# Patient Record
Sex: MALE | Race: WHITE | Employment: FULL TIME | URBAN - METROPOLITAN AREA
[De-identification: names, ages, dates, MRNs, and addresses within clinical notes are randomized per-mention and may not be internally consistent; named-entity substitution may affect disease eponyms.]

---

## 2017-09-14 ENCOUNTER — OFFICE VISIT (OUTPATIENT)
Dept: SURGERY | Age: 52
End: 2017-09-14

## 2017-09-14 VITALS
HEIGHT: 72 IN | SYSTOLIC BLOOD PRESSURE: 124 MMHG | DIASTOLIC BLOOD PRESSURE: 88 MMHG | BODY MASS INDEX: 28.17 KG/M2 | WEIGHT: 208 LBS

## 2017-09-14 DIAGNOSIS — K40.90 RIGHT INGUINAL HERNIA: Primary | ICD-10-CM

## 2017-09-14 PROCEDURE — 99243 OFF/OP CNSLTJ NEW/EST LOW 30: CPT | Performed by: SURGERY

## 2017-09-14 RX ORDER — ATORVASTATIN CALCIUM 20 MG/1
20 TABLET, FILM COATED ORAL
COMMUNITY
Start: 2017-03-17

## 2017-09-14 ASSESSMENT — ENCOUNTER SYMPTOMS
RESPIRATORY NEGATIVE: 1
GASTROINTESTINAL NEGATIVE: 1
ALLERGIC/IMMUNOLOGIC NEGATIVE: 1
EYES NEGATIVE: 1

## 2017-12-04 ENCOUNTER — HOSPITAL ENCOUNTER (OUTPATIENT)
Dept: SURGERY | Age: 52
Discharge: HOME | End: 2017-12-04
Attending: SURGERY | Admitting: SURGERY

## 2017-12-04 VITALS
SYSTOLIC BLOOD PRESSURE: 139 MMHG | WEIGHT: 209.3 LBS | HEART RATE: 93 BPM | BODY MASS INDEX: 28.35 KG/M2 | HEIGHT: 72 IN | DIASTOLIC BLOOD PRESSURE: 91 MMHG | RESPIRATION RATE: 16 BRPM | TEMPERATURE: 97.9 F | OXYGEN SATURATION: 100 %

## 2017-12-04 PROCEDURE — 49650 LAP ING HERNIA REPAIR INIT: CPT | Performed by: SURGERY

## 2017-12-04 RX ORDER — CEFAZOLIN SODIUM 2 G/100ML
2 INJECTION, SOLUTION INTRAVENOUS ONCE
Status: COMPLETED | OUTPATIENT
Start: 2017-12-04 | End: 2017-12-04

## 2017-12-04 RX ORDER — MORPHINE SULFATE 2 MG/ML
1 INJECTION, SOLUTION INTRAMUSCULAR; INTRAVENOUS EVERY 5 MIN PRN
Status: DISCONTINUED | OUTPATIENT
Start: 2017-12-04 | End: 2017-12-05 | Stop reason: HOSPADM

## 2017-12-04 RX ORDER — SODIUM CHLORIDE 0.9 % (FLUSH) 0.9 %
10 SYRINGE (ML) INJECTION EVERY 12 HOURS SCHEDULED
Status: DISCONTINUED | OUTPATIENT
Start: 2017-12-04 | End: 2017-12-05 | Stop reason: HOSPADM

## 2017-12-04 RX ORDER — OXYCODONE HYDROCHLORIDE AND ACETAMINOPHEN 5; 325 MG/1; MG/1
1-2 TABLET ORAL EVERY 4 HOURS PRN
Qty: 30 TABLET | Refills: 0 | Status: SHIPPED | OUTPATIENT
Start: 2017-12-04 | End: 2017-12-11

## 2017-12-04 RX ORDER — OXYCODONE HYDROCHLORIDE AND ACETAMINOPHEN 5; 325 MG/1; MG/1
TABLET ORAL
Status: DISPENSED
Start: 2017-12-04 | End: 2017-12-04

## 2017-12-04 RX ORDER — HYDROMORPHONE HCL 110MG/55ML
0.25 PATIENT CONTROLLED ANALGESIA SYRINGE INTRAVENOUS EVERY 5 MIN PRN
Status: DISCONTINUED | OUTPATIENT
Start: 2017-12-04 | End: 2017-12-05 | Stop reason: HOSPADM

## 2017-12-04 RX ORDER — HYDROCODONE BITARTRATE AND ACETAMINOPHEN 5; 325 MG/1; MG/1
1 TABLET ORAL PRN
Status: ACTIVE | OUTPATIENT
Start: 2017-12-04 | End: 2017-12-04

## 2017-12-04 RX ORDER — ONDANSETRON 2 MG/ML
4 INJECTION INTRAMUSCULAR; INTRAVENOUS
Status: ACTIVE | OUTPATIENT
Start: 2017-12-04 | End: 2017-12-04

## 2017-12-04 RX ORDER — LABETALOL HYDROCHLORIDE 5 MG/ML
5 INJECTION, SOLUTION INTRAVENOUS EVERY 10 MIN PRN
Status: DISCONTINUED | OUTPATIENT
Start: 2017-12-04 | End: 2017-12-05 | Stop reason: HOSPADM

## 2017-12-04 RX ORDER — HYDROMORPHONE HCL 110MG/55ML
0.5 PATIENT CONTROLLED ANALGESIA SYRINGE INTRAVENOUS EVERY 5 MIN PRN
Status: DISCONTINUED | OUTPATIENT
Start: 2017-12-04 | End: 2017-12-05 | Stop reason: HOSPADM

## 2017-12-04 RX ORDER — MIDAZOLAM HYDROCHLORIDE 1 MG/ML
2 INJECTION INTRAMUSCULAR; INTRAVENOUS
Status: ACTIVE | OUTPATIENT
Start: 2017-12-04 | End: 2017-12-04

## 2017-12-04 RX ORDER — MORPHINE SULFATE 10 MG/ML
2 INJECTION, SOLUTION INTRAMUSCULAR; INTRAVENOUS EVERY 5 MIN PRN
Status: DISCONTINUED | OUTPATIENT
Start: 2017-12-04 | End: 2017-12-05 | Stop reason: HOSPADM

## 2017-12-04 RX ORDER — SODIUM CHLORIDE 0.9 % (FLUSH) 0.9 %
10 SYRINGE (ML) INJECTION PRN
Status: DISCONTINUED | OUTPATIENT
Start: 2017-12-04 | End: 2017-12-05 | Stop reason: HOSPADM

## 2017-12-04 RX ORDER — SODIUM CHLORIDE, SODIUM LACTATE, POTASSIUM CHLORIDE, CALCIUM CHLORIDE 600; 310; 30; 20 MG/100ML; MG/100ML; MG/100ML; MG/100ML
INJECTION, SOLUTION INTRAVENOUS CONTINUOUS
Status: DISCONTINUED | OUTPATIENT
Start: 2017-12-04 | End: 2017-12-05 | Stop reason: HOSPADM

## 2017-12-04 RX ORDER — HYDROCODONE BITARTRATE AND ACETAMINOPHEN 5; 325 MG/1; MG/1
2 TABLET ORAL PRN
Status: ACTIVE | OUTPATIENT
Start: 2017-12-04 | End: 2017-12-04

## 2017-12-04 RX ORDER — OXYCODONE HYDROCHLORIDE AND ACETAMINOPHEN 5; 325 MG/1; MG/1
1 TABLET ORAL ONCE
Status: COMPLETED | OUTPATIENT
Start: 2017-12-04 | End: 2017-12-04

## 2017-12-04 RX ADMIN — OXYCODONE HYDROCHLORIDE AND ACETAMINOPHEN 1 TABLET: 5; 325 TABLET ORAL at 11:50

## 2017-12-04 RX ADMIN — SODIUM CHLORIDE, SODIUM LACTATE, POTASSIUM CHLORIDE, CALCIUM CHLORIDE: 600; 310; 30; 20 INJECTION, SOLUTION INTRAVENOUS at 08:08

## 2017-12-04 RX ADMIN — CEFAZOLIN SODIUM 2 G: 2 INJECTION, SOLUTION INTRAVENOUS at 09:06

## 2017-12-04 ASSESSMENT — PAIN DESCRIPTION - FREQUENCY: FREQUENCY: CONTINUOUS

## 2017-12-04 ASSESSMENT — PAIN DESCRIPTION - ORIENTATION: ORIENTATION: LOWER;MID

## 2017-12-04 ASSESSMENT — PAIN SCALES - GENERAL
PAINLEVEL_OUTOF10: 2
PAINLEVEL_OUTOF10: 4
PAINLEVEL_OUTOF10: 4
PAINLEVEL_OUTOF10: 3
PAINLEVEL_OUTOF10: 4

## 2017-12-04 ASSESSMENT — PAIN DESCRIPTION - PAIN TYPE: TYPE: SURGICAL PAIN

## 2017-12-04 ASSESSMENT — PAIN DESCRIPTION - DESCRIPTORS: DESCRIPTORS: ACHING;DULL

## 2017-12-04 ASSESSMENT — PAIN DESCRIPTION - LOCATION: LOCATION: ABDOMEN

## 2017-12-04 ASSESSMENT — PAIN - FUNCTIONAL ASSESSMENT: PAIN_FUNCTIONAL_ASSESSMENT: 0-10

## 2017-12-04 NOTE — ANESTHESIA PRE-OP
History   Substance Use Topics    Smoking status: Never Smoker    Smokeless tobacco: Never Used    Alcohol use Yes      Comment: RARE                                Counseling given: Not Answered      Vital Signs (Current):   Vitals:    12/04/17 0735   Weight: 209 lb 4.8 oz (94.9 kg)   Height: 6' (1.829 m)                                              BP Readings from Last 3 Encounters:   09/14/17 124/88       NPO Status: Time of last liquid consumption: 2200                        Time of last solid consumption: 1900                        Date of last liquid consumption: 12/03/17                        Date of last solid food consumption: 12/03/17    BMI:   Wt Readings from Last 3 Encounters:   12/04/17 209 lb 4.8 oz (94.9 kg)   11/29/17 210 lb (95.3 kg)   09/14/17 208 lb (94.3 kg)     Body mass index is 28.39 kg/m². Anesthesia Evaluation  Patient summary reviewed  Airway: Mallampati: II  TM distance: >3 FB   Neck ROM: full  Mouth opening: > = 3 FB Dental: normal exam         Pulmonary:Negative Pulmonary ROS and normal exam  breath sounds clear to auscultation                             Cardiovascular:Negative CV ROS  Exercise tolerance: good (>4 METS),   (+) hyperlipidemia        Rhythm: regular  Rate: normal           Beta Blocker:  Not on Beta Blocker         Neuro/Psych:   Negative Neuro/Psych ROS              GI/Hepatic/Renal: Neg GI/Hepatic/Renal ROS            Endo/Other: Negative Endo/Other ROS                    Abdominal:           Vascular: negative vascular ROS. Anesthesia Plan      general     ASA 2       Induction: intravenous. Anesthetic plan and risks discussed with patient. Plan discussed with CRNA.                   Jose Neves MD   12/4/2017

## 2017-12-04 NOTE — ANESTHESIA POST-OP
Anesthesia Post-op Note    Patient: Alivia Corey  MRN: [id number]  YOB: 1965  Date of evaluation: 12/4/2017  Time:  12:40 PM     Procedure(s) Performed:     Last Vitals: BP (!) 139/91   Pulse 93   Temp 97.9 °F (36.6 °C) (Temporal)   Resp 16   Ht 6' (1.829 m)   Wt 209 lb 4.8 oz (94.9 kg)   SpO2 100%   BMI 28.39 kg/m²     Ana Maria Phase I: Ana Maria Score: 10    Ana Maria Phase II: Ana Maria Score: 10    Anesthesia Post Evaluation    Final anesthesia type: general  Patient location during evaluation: OR  Patient participation: complete - patient participated  Level of consciousness: awake  Airway patency: patent  Nausea & Vomiting: no vomiting and no nausea  Complications: no  Cardiovascular status: blood pressure returned to baseline and hemodynamically stable  Respiratory status: acceptable  Hydration status: stable        Mercedez Day MD  12:40 PM

## 2017-12-04 NOTE — OP NOTE
[facility] [address]                      [address] [facility], [address] [facility]                                 OPERATIVE REPORT    PATIENT NAME: Kimmie Gonzalez                 :        1965  MED REC NO:   [id number]                          ROOM:  ACCOUNT NO:   [de-identified]                          ADMIT DATE: 2017  PROVIDER:     Handy Gallagher MD    DATE OF PROCEDURE:  2017    PREOPERATIVE DIAGNOSIS:  Right inguinal hernia. POSTOPERATIVE DIAGNOSIS:  Right inguinal hernia. PROCEDURE:  Laparoscopic right inguinal hernia repair with mesh. SURGEON:  Handy Glalagher MD    ANESTHESIA:  General endotracheal    ESTIMATED BLOOD LOSS:  Minimal.    COMPLICATIONS:  None. SPECIMEN:  None. OPERATIVE INDICATIONS:  The patient is a pleasant 80-year-old male with a  symptomatic right inguinal hernia. He is brought in today for repair. He  was explained the risks, benefits, and possible complications including  risk of hernia recurrence, infection requiring mesh removal or nerve  entrapment. DETAILS OF THE PROCEDURE:  The patient was brought to the operative suite,  placed in the supine position on the operative field. After general  endotracheal anesthesia, he was prepped and draped in usual sterile  fashion. We made a 12-mm transverse incision below the umbilicus. Dissection was carried down to the level of the external abdominal oblique  overlying the right rectus sheath. The external abdominal oblique was  opened vertically. The rectus muscles was then reflected laterally and we  created a space posterior to the rectus muscle. A dissecting balloon was  directed down toward the pubic symphysis and insufflated under direct  visualization. The dissecting balloon was then removed and a 12-mm trocar  was placed at this site. Two 5-mm trocars placed in the midline just below  the 12-mm trocar site.   The lateral space was cleared with blunt

## 2017-12-04 NOTE — PROGRESS NOTES
Patient education given  and the patient expresses understanding and acceptance of instructions. Carlos Yang 12/4/2017 12:08 PM  Discharge instructions reviewed with patient/responsible adult. All home medications have been reviewed, questions answered and patient verbalized understanding. Discharge instructions signed and copies given.

## 2017-12-04 NOTE — PROGRESS NOTES
Adm to Phase I PACU from OR awakening and responding. Abdomen soft. Denies pain VS and respirations adequate.  Will continue to monitor

## 2017-12-04 NOTE — H&P
Nahum Lewis is an 46 y.o.  male. Past Medical History:   Diagnosis Date    Hyperlipidemia        Allergies: No Known Allergies    Active Problems:    * No active hospital problems. *    There were no vitals taken for this visit. ROS    Physical Exam   Cardiovascular: Normal rate and regular rhythm.     Pulmonary/Chest: Effort normal and breath sounds normal.       Assessment:  R inguinal hernia    Plan:  Lap R inguinal hernia repair with mesh    Pk Kendall MD  12/4/2017

## 2017-12-21 ENCOUNTER — OFFICE VISIT (OUTPATIENT)
Dept: SURGERY | Age: 52
End: 2017-12-21

## 2017-12-21 VITALS — BODY MASS INDEX: 29.16 KG/M2 | DIASTOLIC BLOOD PRESSURE: 84 MMHG | SYSTOLIC BLOOD PRESSURE: 110 MMHG | WEIGHT: 215 LBS

## 2017-12-21 DIAGNOSIS — K40.90 RIGHT INGUINAL HERNIA: Primary | ICD-10-CM

## 2017-12-21 PROCEDURE — 99024 POSTOP FOLLOW-UP VISIT: CPT | Performed by: SURGERY

## 2017-12-21 NOTE — PROGRESS NOTES
Subjective:      Patient ID: Cameron Renteria is a 46 y.o. male. HPI    Review of Systems    Objective:   Physical Exam  Incision healing well  Assessment:      26-year-old male status post laparoscopic right inguinal hernia repair with mesh. Doing well postoperatively. Plan:      Continue lifting restrictions for 2 more weeks. Follow up on an as-needed basis.

## 2017-12-21 NOTE — LETTER
[address]  [address]  [address]  [address]  Phone: [phone]  Fax: [phone]    Misbah Blas MD        December 21, 2017     DO [place]ar[place] [address]    Patient: Cameron Renteria  MR Number: [id number]  YOB: 1965  Date of Visit: 12/21/2017    Dear Dr. Shant Perez:    Thank you for the request for consultation for Cameron Renteria. Below are the relevant portions of my assessment and plan of care. Assessment:     41-year-old male status post laparoscopic right inguinal hernia repair with mesh. Doing well postoperatively. Plan:     Continue lifting restrictions for 2 more weeks. Follow up on an as-needed basis. If you have questions, please do not hesitate to call me.      Sincerely,        Misbah Blas MD

## 2022-10-11 ENCOUNTER — NURSE ONLY (OUTPATIENT)
Dept: CARDIOLOGY CLINIC | Age: 57
End: 2022-10-11

## 2022-10-11 ENCOUNTER — HOSPITAL ENCOUNTER (OUTPATIENT)
Dept: VASCULAR LAB | Age: 57
Discharge: HOME | End: 2022-10-11

## 2022-10-11 DIAGNOSIS — Z00.00 ANNUAL PHYSICAL EXAM: Primary | ICD-10-CM

## 2022-10-11 LAB
A/G RATIO: 2.7 (ref 1.1–2.2)
ALBUMIN SERPL-MCNC: 4.8 G/DL (ref 3.4–5)
ALP BLD-CCNC: 84 U/L (ref 40–129)
ALT SERPL-CCNC: 32 U/L (ref 10–40)
ANION GAP SERPL CALCULATED.3IONS-SCNC: 10 MMOL/L (ref 3–16)
AST SERPL-CCNC: 23 U/L (ref 15–37)
BASOPHILS ABSOLUTE: 0 K/UL (ref 0–0.2)
BASOPHILS RELATIVE PERCENT: 0.8 %
BILIRUB SERPL-MCNC: 0.8 MG/DL (ref 0–1)
BILIRUBIN DIRECT: <0.2 MG/DL (ref 0–0.3)
BILIRUBIN, INDIRECT: NORMAL MG/DL (ref 0–1)
BUN BLDV-MCNC: 14 MG/DL (ref 7–20)
CALCIUM SERPL-MCNC: 9.6 MG/DL (ref 8.3–10.6)
CHLORIDE BLD-SCNC: 102 MMOL/L (ref 99–110)
CHOLESTEROL, TOTAL: 184 MG/DL (ref 0–199)
CO2: 28 MMOL/L (ref 21–32)
CREAT SERPL-MCNC: 1.1 MG/DL (ref 0.9–1.3)
EOSINOPHILS ABSOLUTE: 0.1 K/UL (ref 0–0.6)
EOSINOPHILS RELATIVE PERCENT: 2.8 %
GFR AFRICAN AMERICAN: >60
GFR NON-AFRICAN AMERICAN: >60
GLUCOSE BLD-MCNC: 96 MG/DL (ref 70–99)
HCT VFR BLD CALC: 44.9 % (ref 40.5–52.5)
HDLC SERPL-MCNC: 55 MG/DL (ref 40–60)
HEMOGLOBIN: 15.5 G/DL (ref 13.5–17.5)
HEPATITIS C ANTIBODY INTERPRETATION: NORMAL
LDL CHOLESTEROL CALCULATED: 103 MG/DL
LYMPHOCYTES ABSOLUTE: 0.9 K/UL (ref 1–5.1)
LYMPHOCYTES RELATIVE PERCENT: 23.6 %
MCH RBC QN AUTO: 31.1 PG (ref 26–34)
MCHC RBC AUTO-ENTMCNC: 34.7 G/DL (ref 31–36)
MCV RBC AUTO: 89.6 FL (ref 80–100)
MONOCYTES ABSOLUTE: 0.3 K/UL (ref 0–1.3)
MONOCYTES RELATIVE PERCENT: 8.4 %
NEUTROPHILS ABSOLUTE: 2.4 K/UL (ref 1.7–7.7)
NEUTROPHILS RELATIVE PERCENT: 64.4 %
PDW BLD-RTO: 12.7 % (ref 12.4–15.4)
PLATELET # BLD: 200 K/UL (ref 135–450)
PMV BLD AUTO: 7.5 FL (ref 5–10.5)
POTASSIUM SERPL-SCNC: 5.2 MMOL/L (ref 3.5–5.1)
PROSTATE SPECIFIC ANTIGEN: 3.5 NG/ML (ref 0–4)
RBC # BLD: 5 M/UL (ref 4.2–5.9)
SODIUM BLD-SCNC: 140 MMOL/L (ref 136–145)
T3 TOTAL: 1.23 NG/ML (ref 0.8–2)
T4 TOTAL: 7.6 UG/DL (ref 4.5–10.9)
TOTAL PROTEIN: 6.6 G/DL (ref 6.4–8.2)
TRIGL SERPL-MCNC: 130 MG/DL (ref 0–150)
VLDLC SERPL CALC-MCNC: 26 MG/DL
WBC # BLD: 3.8 K/UL (ref 4–11)

## 2022-10-11 PROCEDURE — 9900000021 HC VASC SCREENING EXAM - SELF PAY

## 2022-10-19 ENCOUNTER — TELEPHONE (OUTPATIENT)
Dept: CARDIOLOGY CLINIC | Age: 57
End: 2022-10-19

## 2022-10-19 NOTE — TELEPHONE ENCOUNTER
Called patient and [facility] for patient to return call to office for message below.

## 2022-10-24 ENCOUNTER — HOSPITAL ENCOUNTER (OUTPATIENT)
Dept: NON INVASIVE DIAGNOSTICS | Age: 57
Discharge: HOME | End: 2022-10-24

## 2022-10-24 PROCEDURE — 93017 CV STRESS TEST TRACING ONLY: CPT | Performed by: NURSE PRACTITIONER

## 2022-10-24 NOTE — PROGRESS NOTES
Patient instructed on Kamlesh Protocol Stress Test Procedure including possible side effects and adverse reactions. Verbalizes knowledge and understanding and denies having any questions.

## 2022-11-01 ENCOUNTER — OFFICE VISIT (OUTPATIENT)
Dept: CARDIOLOGY CLINIC | Age: 57
End: 2022-11-01

## 2022-11-01 VITALS
DIASTOLIC BLOOD PRESSURE: 70 MMHG | HEART RATE: 90 BPM | SYSTOLIC BLOOD PRESSURE: 120 MMHG | BODY MASS INDEX: 28.35 KG/M2 | HEIGHT: 72 IN | WEIGHT: 209.3 LBS | OXYGEN SATURATION: 97 %

## 2022-11-01 DIAGNOSIS — Z00.00 ANNUAL PHYSICAL EXAM: Primary | ICD-10-CM

## 2022-11-01 NOTE — PROGRESS NOTES
[facility] Annual Physical Exam        11/1/22    Chiara Camargo 1965 62 y.o. Diagnosis Orders   1. Annual physical exam            Primary Prevention:   Wears seat belt: Yes   Screening colonoscopy: '12 : negative   Annual opthalmologic exam: '22 ; needs reading glasses   Smoking: No   Diet: Regular    Exercise: walks daily 30 min   Dermatology exam: No   COVID-19 vaccination : no ; suspects had gotten COVID Feb '22   Flu-vaccination: NA   Sunscreen: occ   Dentist : biannually     Current Outpatient Medications   Medication Sig Dispense Refill    atorvastatin (LIPITOR) 20 MG tablet Take 20 mg by mouth       No current facility-administered medications for this visit. Review of Systems   Constitutional: Negative for appetite change, fatigue and unexpected weight change. HENT: Negative. Eyes: Negative. Respiratory: Negative. Negative for chest tightness and shortness of breath. Cardiovascular: Negative. Negative for chest pain, palpitations and leg swelling. Gastrointestinal: Negative. Negative for diarrhea and constipation. Genitourinary: Negative for urgency, frequency, hematuria and difficulty urinating. Musculoskeletal: Negative for myalgias, back pain, joint swelling and arthralgias. Skin: Negative. Neurological: Negative for dizziness, syncope, weakness, light-headedness, numbness and headaches. Hematological: Negative. Psychiatric/Behavioral: Negative. The patient is not nervous/anxious.       Vitals:    11/01/22 1418 11/01/22 1445   BP: 120/80 120/70   Site: Right Upper Arm Left Upper Arm   Position: Sitting    Cuff Size: Medium Adult Medium Adult   Pulse: 90    SpO2: 97%    Weight: 209 lb 4.8 oz (94.9 kg)    Height: 6' (1.829 m)        /80 (Site: Right Upper Arm, Position: Sitting, Cuff Size: Medium Adult)   Pulse 90   Ht 6' (1.829 m)   Wt 209 lb 4.8 oz (94.9 kg)   SpO2 97%   BMI 28.39 kg/m²     Physical Exam   Constitutional: He is oriented to person, place, and time. He appears well-developed and well-nourished. HENT: external ear canals patent bilaterally; TM without injection, normal cone of light; oral mucosa moist/pink, uvula midline  Head: Normocephalic. Eyes: Pupils are equal, round, and reactive to light; gross fundoscopy exam normal   Neck: Neck supple. No JVD present. No tracheal deviation present. No thyromegaly present. Cardiovascular: Normal rate, regular rhythm and intact distal pulses. PMI is not displaced. No murmur heard. Pulmonary/Chest: Effort normal and breath sounds normal. He has no wheezes. He has no rales. He exhibits no tenderness. Abdominal: Soft. Bowel sounds are normal. He exhibits no distension and no mass. No tenderness. Musculoskeletal: Normal range of motion. He exhibits no edema and no tenderness. Lymphadenopathy: He has no cervical/axilla adenopathy. Neurological: He is alert and oriented to person, place, and time. No cranial nerve deficit. Coordination normal.   Skin: Skin is warm and dry. No rash noted. No erythema. Psychiatric: He has a normal mood and affect.  His behavior is normal. Judgment and thought content normal.       Lab Results   Component Value Date    CHOL 184 10/11/2022     Lab Results   Component Value Date    TRIG 130 10/11/2022     Lab Results   Component Value Date    HDL 55 10/11/2022    HDL 48 06/19/2020     Lab Results   Component Value Date    LDLCALC 103 (H) 10/11/2022    LDLCALC 88 06/19/2020     Lab Results   Component Value Date    LABVLDL 26 10/11/2022    LABVLDL 14 06/19/2020     No results found for: [facility]  Lab Results   Component Value Date/Time     10/11/2022 08:43 AM    K 5.2 10/11/2022 08:43 AM     10/11/2022 08:43 AM    CO2 28 10/11/2022 08:43 AM    BUN 14 10/11/2022 08:43 AM    CREATININE 1.1 10/11/2022 08:43 AM    GLUCOSE 96 10/11/2022 08:43 AM    CALCIUM 9.6 10/11/2022 08:43 AM      Lab Results   Component Value Date    WBC 3.8 (L) 10/11/2022    HGB 15.5 10/11/2022    HCT 44.9 10/11/2022    MCV 89.6 10/11/2022     10/11/2022     Lab Results   Component Value Date    E0HASFW 1.23 10/11/2022    W0APHGO 7.6 10/11/2022     Lab Results   Component Value Date    PSA 3.50 10/11/2022    PSA 2.86 06/19/2020     Lab Results   Component Value Date    ALT 32 10/11/2022    AST 23 10/11/2022    ALKPHOS 84 10/11/2022    BILITOT 0.8 10/11/2022           10/11/22:Urinalysis: negative   Stool guaiac: NA    10/11/22: Vascular screening:   Carotid: neg   Abdominal aorta: neg   Peripheral arteries: neg      10/24/22: GXT:    Summary   Normal stress EKG       Rest      ECG   NSR      Stress      Stress Type: Exercise      Stress Protocol: Kamlesh      Rest HR: 83 bpm                             HR BP Product: [id number]   Rest BP: 124/94 mmHg                        Max Exercise: 7 METS   Stress Peak HR: 148 bpm   Stress Peak BP: 171/80 mmHg   Predicted HR: 164 bpm   % of predicted HR: 90   Test Duration: 6 min and 30 sec   Reason for Termination: Physiologic Maximum      Results      ECG   Patient exercised 6:30 min:s of a Kamlesh protocol achieving 7.00 METS and 90%   MAPHR. Normal HR and BP response. No ischemic ST changes with exercise. Arrhythmias   None      Symptoms   There was stress induced shortness of breath. Symptoms resolved with rest.   Denies any chest pain or discomfort. Diagnosis Orders   1.  Annual physical exam   ~unremarkable           Assessment    Stable   GXT: negative stress EKG for ischemia    Plan    Follow up in one year          Kristyn Arvizu CNP

## 2023-10-05 ENCOUNTER — NURSE ONLY (OUTPATIENT)
Dept: CARDIOLOGY CLINIC | Age: 58
End: 2023-10-05

## 2023-10-05 ENCOUNTER — HOSPITAL ENCOUNTER (OUTPATIENT)
Dept: VASCULAR LAB | Age: 58
Discharge: HOME | End: 2023-10-05

## 2023-10-05 DIAGNOSIS — Z13.6 ENCOUNTER FOR SCREENING FOR VASCULAR DISEASE: Primary | ICD-10-CM

## 2023-10-05 DIAGNOSIS — Z00.00 ANNUAL PHYSICAL EXAM: Primary | ICD-10-CM

## 2023-10-05 LAB
BASOPHILS # BLD: 0 K/UL (ref 0–0.2)
BASOPHILS NFR BLD: 1 %
DEPRECATED RDW RBC AUTO: 12.6 % (ref 12.4–15.4)
EOSINOPHIL # BLD: 0.1 K/UL (ref 0–0.6)
EOSINOPHIL NFR BLD: 2.9 %
HCT VFR BLD AUTO: 43.2 % (ref 40.5–52.5)
HCV AB SERPL QL IA: NORMAL
HGB BLD-MCNC: 14.9 G/DL (ref 13.5–17.5)
LYMPHOCYTES # BLD: 1 K/UL (ref 1–5.1)
LYMPHOCYTES NFR BLD: 27.5 %
MCH RBC QN AUTO: 30.9 PG (ref 26–34)
MCHC RBC AUTO-ENTMCNC: 34.4 G/DL (ref 31–36)
MCV RBC AUTO: 89.8 FL (ref 80–100)
MONOCYTES # BLD: 0.3 K/UL (ref 0–1.3)
MONOCYTES NFR BLD: 8.6 %
NEUTROPHILS # BLD: 2.2 K/UL (ref 1.7–7.7)
NEUTROPHILS NFR BLD: 60 %
PLATELET # BLD AUTO: 193 K/UL (ref 135–450)
PMV BLD AUTO: 8.1 FL (ref 5–10.5)
PSA SERPL DL<=0.01 NG/ML-MCNC: 4.13 NG/ML (ref 0–4)
RBC # BLD AUTO: 4.81 M/UL (ref 4.2–5.9)
T3 SERPL-MCNC: 0.99 NG/ML (ref 0.8–2)
T4 SERPL-MCNC: 6.9 UG/DL (ref 4.5–10.9)
WBC # BLD AUTO: 3.7 K/UL (ref 4–11)

## 2023-10-05 PROCEDURE — 36415 COLL VENOUS BLD VENIPUNCTURE: CPT | Performed by: NURSE PRACTITIONER

## 2023-10-05 PROCEDURE — 9900000021 HC VASC SCREENING EXAM - SELF PAY

## 2023-10-06 LAB
ALBUMIN SERPL-MCNC: 4.6 G/DL (ref 3.4–5)
ALBUMIN/GLOB SERPL: 2.4 {RATIO} (ref 1.1–2.2)
ALP SERPL-CCNC: 82 U/L (ref 40–129)
ALT SERPL-CCNC: 30 U/L (ref 10–40)
ANION GAP SERPL CALCULATED.3IONS-SCNC: 9 MMOL/L (ref 3–16)
AST SERPL-CCNC: 22 U/L (ref 15–37)
BILIRUB DIRECT SERPL-MCNC: <0.2 MG/DL (ref 0–0.3)
BILIRUB INDIRECT SERPL-MCNC: NORMAL MG/DL (ref 0–1)
BILIRUB SERPL-MCNC: 0.7 MG/DL (ref 0–1)
BUN SERPL-MCNC: 14 MG/DL (ref 7–20)
CALCIUM SERPL-MCNC: 9.2 MG/DL (ref 8.3–10.6)
CHLORIDE SERPL-SCNC: 103 MMOL/L (ref 99–110)
CHOLEST SERPL-MCNC: 165 MG/DL (ref 0–199)
CO2 SERPL-SCNC: 28 MMOL/L (ref 21–32)
CREAT SERPL-MCNC: 1.1 MG/DL (ref 0.9–1.3)
GFR SERPLBLD CREATININE-BSD FMLA CKD-EPI: >60 ML/MIN/{1.73_M2}
GLUCOSE SERPL-MCNC: 93 MG/DL (ref 70–99)
HDLC SERPL-MCNC: 49 MG/DL (ref 40–60)
LDLC SERPL CALC-MCNC: 82 MG/DL
POTASSIUM SERPL-SCNC: 4.5 MMOL/L (ref 3.5–5.1)
PROT SERPL-MCNC: 6.5 G/DL (ref 6.4–8.2)
SODIUM SERPL-SCNC: 140 MMOL/L (ref 136–145)
TRIGL SERPL-MCNC: 170 MG/DL (ref 0–150)
VLDLC SERPL CALC-MCNC: 34 MG/DL

## 2023-11-08 ENCOUNTER — HOSPITAL ENCOUNTER (OUTPATIENT)
Dept: NON INVASIVE DIAGNOSTICS | Age: 58
Discharge: HOME | End: 2023-11-08

## 2023-11-08 ENCOUNTER — OFFICE VISIT (OUTPATIENT)
Dept: CARDIOLOGY CLINIC | Age: 58
End: 2023-11-08

## 2023-11-08 VITALS
BODY MASS INDEX: 28.99 KG/M2 | DIASTOLIC BLOOD PRESSURE: 74 MMHG | OXYGEN SATURATION: 97 % | SYSTOLIC BLOOD PRESSURE: 118 MMHG | HEIGHT: 72 IN | HEART RATE: 93 BPM | WEIGHT: 214 LBS

## 2023-11-08 DIAGNOSIS — Z00.00 ANNUAL PHYSICAL EXAM: Primary | ICD-10-CM

## 2023-11-08 PROCEDURE — 93017 CV STRESS TEST TRACING ONLY: CPT | Performed by: INTERNAL MEDICINE

## 2023-11-08 NOTE — PROGRESS NOTES
9.2 10/05/2023 08:34 AM      Lab Results   Component Value Date    WBC 3.7 (L) 10/05/2023    HGB 14.9 10/05/2023    HCT 43.2 10/05/2023    MCV 89.8 10/05/2023     10/05/2023     Lab Results   Component Value Date    M4HTYIU 0.99 10/05/2023    K9ZXWMD 6.9 10/05/2023     Lab Results   Component Value Date    PSA 4.13 (H) 10/05/2023    PSA 3.50 10/11/2022    PSA 2.86 06/19/2020     Lab Results   Component Value Date    ALT 30 10/05/2023    AST 22 10/05/2023    ALKPHOS 82 10/05/2023    BILITOT 0.7 10/05/2023           10/05/23:Urinalysis: negative   Stool guaiac: NA    10/05/23: Vascular screening:   Carotid: neg   Abdominal aorta: neg   Peripheral arteries: neg      11/8/23: GXT:      Summary   Normal treadmill exercise test.       Rest      ECG   Normal sinus rhythm. Pre-Stress Physical Exam   Plain      Stress      Stress Type: Exercise      Stress Protocol: Kamlesh      Rest HR: 95 bpm                             HR BP Product: [id number]   Rest BP: 131/96 mmHg                        Max Exercise: 10 METS   Stress Peak HR: 148 bpm   Stress Peak BP: 159/84 mmHg   Predicted HR: 162 bpm   % of predicted HR: 91   Test Duration: 6 min and 59 sec   Reason for Termination: Physiologic Maximum      Stress Interpretation      Normal stress test.      Results      ECG   Normal (Negative) response to exercise. Symptoms   There was stress induced shortness of breath. Symptoms resolved with rest.   Denies chest pain or discomfort throughout test.         Diagnosis Orders   1.  Annual physical exam   ~cerumen buildup Rt  ~PSA trending up : states seeing Dr. Asaf Sheehan tomorrow           Assessment    Stable   GXT: negative stress EKG for ischemia    Plan  Encouraged to schedule routinely colonoscopy now at 10 yr delta ; will look into after w/u with urology   Follow up in one year      Yanni Kelley, APRN-CNP

## 2024-10-02 ENCOUNTER — NURSE ONLY (OUTPATIENT)
Dept: CARDIOLOGY CLINIC | Age: 59
End: 2024-10-02

## 2024-10-02 ENCOUNTER — HOSPITAL ENCOUNTER (OUTPATIENT)
Dept: VASCULAR LAB | Age: 59
Discharge: HOME | End: 2024-10-04

## 2024-10-02 DIAGNOSIS — Z00.00 ANNUAL PHYSICAL EXAM: ICD-10-CM

## 2024-10-02 DIAGNOSIS — Z00.00 ANNUAL PHYSICAL EXAM: Primary | ICD-10-CM

## 2024-10-02 LAB
Lab: 1.95 CM
VAS AORTA DIST AP: 1.73 CM
VAS AORTA DIST TR: 2.01 CM
VAS AORTA MID AP: 1.99 CM
VAS AORTA MID PSV: 77.4 CM/S
VAS AORTA MID TRANS: 2.02 CM
VAS AORTA PROX AP: 2.08 CM
VAS AORTA PROX TR: 2.52 CM
VAS LEFT ABI: 1.36
VAS LEFT ARM BP: 110 MMHG
VAS LEFT CCA MID EDV: 31.2 CM/S
VAS LEFT CCA MID PSV: 84.7 CM/S
VAS LEFT DORSALIS PEDIS BP: 150 MMHG
VAS LEFT ECA PSV: 89.5 CM/S
VAS LEFT ICA PROX EDV: 35.4 CM/S
VAS LEFT ICA PROX PSV: 67.1 CM/S
VAS LEFT PTA BP: 120 MMHG
VAS LEFT SUBCLAVIAN PROX PSV: 109 CM/S
VAS LEFT VERTEBRAL EDV: 15.1 CM/S
VAS LEFT VERTEBRAL PSV: 42 CM/S
VAS RIGHT ABI: 1.27
VAS RIGHT ARM BP: 110 MMHG
VAS RIGHT CCA MID EDV: 26.8 CM/S
VAS RIGHT CCA MID PSV: 83 CM/S
VAS RIGHT DORSALIS PEDIS BP: 140 MMHG
VAS RIGHT ECA PSV: 88.4 CM/S
VAS RIGHT ICA PROX EDV: 27.4 CM/S
VAS RIGHT ICA PROX PSV: 58 CM/S
VAS RIGHT PTA BP: 140 MMHG
VAS RIGHT SUBCLAVIAN PROX PSV: 132 CM/S
VAS RIGHT VERTEBRAL EDV: 11.3 CM/S
VAS RIGHT VERTEBRAL PSV: 25.3 CM/S

## 2024-10-02 PROCEDURE — 9900000021 VAS SCREENING (CAROTID/ABDOMINAL/ABI LTD)

## 2024-10-02 PROCEDURE — 36415 COLL VENOUS BLD VENIPUNCTURE: CPT | Performed by: NURSE PRACTITIONER

## 2024-10-03 ENCOUNTER — TELEPHONE (OUTPATIENT)
Dept: CARDIOLOGY CLINIC | Age: 59
End: 2024-10-03

## 2024-10-03 LAB
ALBUMIN SERPL-MCNC: 4.5 G/DL (ref 3.4–5)
ALBUMIN/GLOB SERPL: 2.5 {RATIO} (ref 1.1–2.2)
ALP SERPL-CCNC: 73 U/L (ref 40–129)
ALT SERPL-CCNC: 27 U/L (ref 10–40)
ANION GAP SERPL CALCULATED.3IONS-SCNC: 7 MMOL/L (ref 3–16)
AST SERPL-CCNC: 23 U/L (ref 15–37)
BASOPHILS # BLD: 0 K/UL (ref 0–0.2)
BASOPHILS NFR BLD: 0.3 %
BILIRUB DIRECT SERPL-MCNC: 0.3 MG/DL (ref 0–0.3)
BILIRUB INDIRECT SERPL-MCNC: 0.5 MG/DL (ref 0–1)
BILIRUB SERPL-MCNC: 0.8 MG/DL (ref 0–1)
BUN SERPL-MCNC: 13 MG/DL (ref 7–20)
CALCIUM SERPL-MCNC: 9.4 MG/DL (ref 8.3–10.6)
CHLORIDE SERPL-SCNC: 105 MMOL/L (ref 99–110)
CHOLEST SERPL-MCNC: 158 MG/DL (ref 0–199)
CO2 SERPL-SCNC: 29 MMOL/L (ref 21–32)
CREAT SERPL-MCNC: 1.1 MG/DL (ref 0.9–1.3)
DEPRECATED RDW RBC AUTO: 13.4 % (ref 12.4–15.4)
EOSINOPHIL # BLD: 0.2 K/UL (ref 0–0.6)
EOSINOPHIL NFR BLD: 3.9 %
GFR SERPLBLD CREATININE-BSD FMLA CKD-EPI: 77 ML/MIN/{1.73_M2}
GLUCOSE SERPL-MCNC: 93 MG/DL (ref 70–99)
HCT VFR BLD AUTO: 44 % (ref 40.5–52.5)
HCV AB SERPL QL IA: NORMAL
HDLC SERPL-MCNC: 58 MG/DL (ref 40–60)
HGB BLD-MCNC: 15.1 G/DL (ref 13.5–17.5)
LDLC SERPL CALC-MCNC: 80 MG/DL
LYMPHOCYTES # BLD: 1 K/UL (ref 1–5.1)
LYMPHOCYTES NFR BLD: 25.4 %
MCH RBC QN AUTO: 30.8 PG (ref 26–34)
MCHC RBC AUTO-ENTMCNC: 34.3 G/DL (ref 31–36)
MCV RBC AUTO: 90 FL (ref 80–100)
MONOCYTES # BLD: 0.3 K/UL (ref 0–1.3)
MONOCYTES NFR BLD: 7.1 %
NEUTROPHILS # BLD: 2.6 K/UL (ref 1.7–7.7)
NEUTROPHILS NFR BLD: 63.3 %
PLATELET # BLD AUTO: 186 K/UL (ref 135–450)
PMV BLD AUTO: 8.2 FL (ref 5–10.5)
POTASSIUM SERPL-SCNC: 4.7 MMOL/L (ref 3.5–5.1)
PROT SERPL-MCNC: 6.3 G/DL (ref 6.4–8.2)
PSA SERPL DL<=0.01 NG/ML-MCNC: 4.1 NG/ML (ref 0–4)
RBC # BLD AUTO: 4.89 M/UL (ref 4.2–5.9)
SODIUM SERPL-SCNC: 141 MMOL/L (ref 136–145)
T3 SERPL-MCNC: 1.13 NG/ML (ref 0.8–2)
T4 SERPL-MCNC: 6.2 UG/DL (ref 4.5–10.9)
TRIGL SERPL-MCNC: 98 MG/DL (ref 0–150)
VLDLC SERPL CALC-MCNC: 20 MG/DL
WBC # BLD AUTO: 4.1 K/UL (ref 4–11)

## 2024-10-03 NOTE — TELEPHONE ENCOUNTER
----- Message from AUSTIN Nunn CNP sent at 10/3/2024  1:27 PM EDT -----  Trig improved from last year    PSA essentially unchanged    Has been copied to PCP

## 2024-10-14 ENCOUNTER — OFFICE VISIT (OUTPATIENT)
Dept: CARDIOLOGY CLINIC | Age: 59
End: 2024-10-14

## 2024-10-14 ENCOUNTER — HOSPITAL ENCOUNTER (OUTPATIENT)
Age: 59
Discharge: HOME | End: 2024-10-16

## 2024-10-14 VITALS
DIASTOLIC BLOOD PRESSURE: 82 MMHG | HEIGHT: 72 IN | HEART RATE: 72 BPM | WEIGHT: 192 LBS | SYSTOLIC BLOOD PRESSURE: 124 MMHG | BODY MASS INDEX: 26.01 KG/M2

## 2024-10-14 VITALS
WEIGHT: 192.6 LBS | DIASTOLIC BLOOD PRESSURE: 72 MMHG | HEART RATE: 80 BPM | HEIGHT: 72 IN | BODY MASS INDEX: 26.09 KG/M2 | OXYGEN SATURATION: 97 % | SYSTOLIC BLOOD PRESSURE: 120 MMHG

## 2024-10-14 DIAGNOSIS — Z00.00 ENCOUNTER FOR ANNUAL PHYSICAL EXAM: Primary | ICD-10-CM

## 2024-10-14 DIAGNOSIS — Z00.00 ANNUAL PHYSICAL EXAM: Primary | ICD-10-CM

## 2024-10-14 LAB
ECHO BSA: 2.1 M2
STRESS BASELINE DIAS BP: 82 MMHG
STRESS BASELINE HR: 72 BPM
STRESS BASELINE ST DEPRESSION: 0 MM
STRESS BASELINE SYS BP: 124 MMHG
STRESS ESTIMATED WORKLOAD: 13.4 METS
STRESS EXERCISE DUR MIN: 10 MIN
STRESS EXERCISE DUR SEC: 0 SEC
STRESS O2 SAT REST: 95 %
STRESS PEAK DIAS BP: 83 MMHG
STRESS PEAK SYS BP: 184 MMHG
STRESS PERCENT HR ACHIEVED: 91 %
STRESS POST PEAK HR: 148 BPM
STRESS RATE PRESSURE PRODUCT: NORMAL BPM*MMHG
STRESS ST DEPRESSION: 0 MM
STRESS TARGET HR: 162 BPM

## 2024-10-14 PROCEDURE — 93017 CV STRESS TEST TRACING ONLY: CPT

## 2024-10-14 NOTE — PROGRESS NOTES
[facility] Annual Physical Exam        10/14/24    Ok Price 1965 58 y.o.     Diagnosis Orders   1. Annual physical exam            Primary Prevention:   Wears seat belt: Yes   Screening colonoscopy: '12 : negative ; has not had any repeat scopes    Annual opthalmologic exam: '22 ; occasionally uses reading glasses / cheaters    Smoking: No   Diet: Regular    Exercise: nothing structured ; works around house & yard. Walks daily at lunch for approx 2 miles   Dermatology exam: '24 : cyst removal from toe   Flu-vaccination: NA   Sunscreen: not often; wears bucket hat when out mowing   Dentist : biannually    Tetanus : unknown    Current Outpatient Medications   Medication Sig Dispense Refill    atorvastatin (LIPITOR) 20 MG tablet Take 1 tablet by mouth       No current facility-administered medications for this visit.       Review of Systems   Constitutional: Negative for appetite change, fatigue and unexpected weight change.   HENT: Negative.    Eyes: Negative.    Respiratory: Negative.  Negative for chest tightness and shortness of breath.    Cardiovascular: Negative.  Negative for chest pain, palpitations and leg swelling.   Gastrointestinal: Negative.  Negative for diarrhea and constipation.   Genitourinary: Negative for urgency, frequency, hematuria and difficulty urinating.   Musculoskeletal: Negative for myalgias, back pain, joint swelling and arthralgias.   Skin: toe lesion removed by derm  Neurological: Negative for dizziness, syncope, weakness, light-headedness, numbness and headaches.   Hematological: Negative.    Psychiatric/Behavioral: Negative.  The patient is not nervous/anxious.      Vitals:    10/14/24 1414 10/14/24 1451   BP: 112/74 120/72   Site: Right Upper Arm Right Upper Arm   Position: Sitting    Cuff Size: Medium Adult Medium Adult   Pulse: 80    SpO2: 97%    Weight: 87.4 kg (192 lb 9.6 oz)    Height: 1.829 m (6')      Physical Exam   Constitutional: He is oriented